# Patient Record
Sex: FEMALE
[De-identification: names, ages, dates, MRNs, and addresses within clinical notes are randomized per-mention and may not be internally consistent; named-entity substitution may affect disease eponyms.]

---

## 2017-11-29 ENCOUNTER — HOSPITAL ENCOUNTER (OUTPATIENT)
Dept: HOSPITAL 5 - OR | Age: 52
Setting detail: OBSERVATION
LOS: 1 days | Discharge: HOME | End: 2017-11-30
Attending: SURGERY | Admitting: SURGERY
Payer: COMMERCIAL

## 2017-11-29 DIAGNOSIS — C50.811: ICD-10-CM

## 2017-11-29 DIAGNOSIS — C50.211: Primary | ICD-10-CM

## 2017-11-29 DIAGNOSIS — Z80.3: ICD-10-CM

## 2017-11-29 DIAGNOSIS — C50.411: ICD-10-CM

## 2017-11-29 PROCEDURE — 88342 IMHCHEM/IMCYTCHM 1ST ANTB: CPT

## 2017-11-29 PROCEDURE — G0378 HOSPITAL OBSERVATION PER HR: HCPCS

## 2017-11-29 PROCEDURE — 38525 BIOPSY/REMOVAL LYMPH NODES: CPT

## 2017-11-29 PROCEDURE — 88307 TISSUE EXAM BY PATHOLOGIST: CPT

## 2017-11-29 PROCEDURE — 88333 PATH CONSLTJ SURG CYTO XM 1: CPT

## 2017-11-29 PROCEDURE — 96374 THER/PROPH/DIAG INJ IV PUSH: CPT

## 2017-11-29 PROCEDURE — 76098 X-RAY EXAM SURGICAL SPECIMEN: CPT

## 2017-11-29 PROCEDURE — 19357 TISS XPNDR PLMT BRST RCNSTJ: CPT

## 2017-11-29 PROCEDURE — 88331 PATH CONSLTJ SURG 1 BLK 1SPC: CPT

## 2017-11-29 PROCEDURE — 78800 RP LOCLZJ TUM 1 AREA 1 D IMG: CPT

## 2017-11-29 PROCEDURE — 64450 NJX AA&/STRD OTHER PN/BRANCH: CPT

## 2017-11-29 PROCEDURE — 96375 TX/PRO/DX INJ NEW DRUG ADDON: CPT

## 2017-11-29 PROCEDURE — 19303 MAST SIMPLE COMPLETE: CPT

## 2017-11-29 PROCEDURE — A9541 TC99M SULFUR COLLOID: HCPCS

## 2017-11-29 PROCEDURE — C1789 PROSTHESIS, BREAST, IMP: HCPCS

## 2017-11-29 RX ADMIN — MIDAZOLAM PRN MG: 1 INJECTION INTRAMUSCULAR; INTRAVENOUS at 14:44

## 2017-11-29 RX ADMIN — MIDAZOLAM PRN MG: 1 INJECTION INTRAMUSCULAR; INTRAVENOUS at 11:14

## 2017-11-29 RX ADMIN — MIDAZOLAM PRN MG: 1 INJECTION INTRAMUSCULAR; INTRAVENOUS at 14:47

## 2017-11-29 RX ADMIN — DOCUSATE SODIUM SCH MG: 100 CAPSULE, LIQUID FILLED ORAL at 22:40

## 2017-11-29 NOTE — ANESTHESIA CONSULTATION
Anesthesia Consult and Med Hx


Date of service: 11/29/17





- Airway


Anesthetic Teeth Evaluation: Good


ROM Head & Neck: Adequate


Mental/Hyoid Distance: Adequate


Mallampati Class: Class II


Intubation Access Assessment: Probably Good





- Pulmonary Exam


CTA: Yes





- Cardiac Exam


Cardiac Exam: RRR





- Pre-Operative Health Status


ASA Pre-Surgery Classification: ASA2


Proposed Anesthetic Plan: General (Never had GA - no family member ever had GA)


Nerve Block: pectoral block





- Central Nervous System


Hx Psychiatric Problems: No





- Other Systems


Hx Alcohol Use: No


Hx Substance Use: No


Hx Cancer: Yes (breast canceer; s/p chemo)

## 2017-11-29 NOTE — SHORT STAY SUMMARY
Short Stay Documentation


Date of service: 11/29/17





- History


H&P: obtained from office





- Allergies and Medications


Current Medications: 


 Allergies





No Known Allergies Allergy (Verified 11/27/17 15:47)


 





 Home Medications











 Medication  Instructions  Recorded  Confirmed  Last Taken  Type


 


Ondansetron [Zofran TAB] 4 mg PO PRN PRN 11/27/17 11/27/17 Unknown History


 


Prochlorperazine Maleate 5 mg PO PRN PRN 11/27/17 11/29/17 11/10/17 History





[Compazine]     








Active Medications





Acetaminophen (Tylenol)  650 mg PO Q6H PRN


   PRN Reason: Pain MILD(1-3)/Fever >100.5/HA


Cefazolin Sodium (Ancef/Sterile Water 2 Gm/20 Ml)  2 gm IV PREOP NR


   Stop: 11/29/17 23:59


Celecoxib (Celebrex)  200 mg PO PREOP NR


   Stop: 11/29/17 23:59


   Last Admin: 11/29/17 11:14 Dose:  200 mg


Dexamethasone (Decadron)  8 mg IV ONCE NR


   Stop: 11/29/17 23:59


Diphenhydramine HCl (Benadryl)  25 mg PO Q8H PRN


   PRN Reason: Itching


Docusate Sodium (Colace)  100 mg PO BID LARRY


Ephedrine Sulfate (Ephedrine Sulfate)  10 mg IV Q5M PRN


   PRN Reason: Blood Pressure


   Last Admin: 11/29/17 16:14 Dose:  10 mg


Famotidine (Pepcid)  20 mg PO PREOP NR


   Stop: 11/29/17 23:59


   Last Admin: 11/29/17 11:14 Dose:  20 mg


Gabapentin (Neurontin)  600 mg PO PREOP LARRY


   Stop: 11/29/17 23:59


   Last Admin: 11/29/17 11:14 Dose:  600 mg


Lactated Ringer's (Lactated Ringers)  1,000 mls @ 75 mls/hr IV AS DIRECT LARRY


   Last Admin: 11/29/17 11:13 Dose:  75 mls/hr


Cefazolin Sodium (Ancef/Ns 1 Gm/50 Ml)  1 gm in 50 mls @ 100 mls/hr IV Q8HR LARRY


   PRN Reason: Protocol


Lactated Ringer's (Lactated Ringers)  1,000 mls @ 125 mls/hr IV AS DIRECT LARRY


Metoclopramide HCl (Reglan)  10 mg PO Q6H PRN


   PRN Reason: Nausea And Vomiting


Midazolam HCl (Versed)  2 mg IV PREOP PRN


   PRN Reason: Anxiety


   Stop: 11/29/17 23:59


   Last Admin: 11/29/17 14:47 Dose:  1 mg


Morphine Sulfate (Morphine)  2 mg IV Q4H PRN


   PRN Reason: Pain, Moderate (4-6)


Ondansetron HCl (Zofran)  4 mg IV Q8H PRN


   PRN Reason: N/V unrelieved by Reglan


Oxycodone/Acetaminophen (Percocet 5/325)  1 tab PO Q6H PRN


   PRN Reason: Pain, Moderate (4-6)


Sodium Chloride (Sodium Chloride Flush Syringe 10 Ml)  10 ml IV PRN PRN


   PRN Reason: LINE FLUSH











- Brief post op/procedure progress note


Date of procedure: 11/29/17


Pre-op diagnosis: Right breast ca


Post-op diagnosis: same


Procedure: 





right total mastectomy with SLNB


Anesthesia: GETA


Findings: 





both bx clips present; 2 SLNs and neg on frozen


Surgeon: SALEEM GEORGE


Assistant: STEPHEN JANSEN


Estimated blood loss: minimal


Pathology: list (right mastectomy; 2 SLNs)


Specimen disposition: to lab


Condition: stable





- Disposition


Condition at discharge: Good


Disposition: DC/[id number] [id number] GEN HOSP IP





Short Stay Discharge Plan


Activity: other (no heavy lifting)


Diet: regular


Wound: other (may shower in 24 hours; no baths, pools or lakes)


Follow up with: 


EZRA CA MD [Primary Care Provider] - 7 Days


SALEEM GEORGE MD [Staff Physician] - 7 Days

## 2017-11-30 VITALS — DIASTOLIC BLOOD PRESSURE: 47 MMHG | SYSTOLIC BLOOD PRESSURE: 82 MMHG

## 2017-11-30 RX ADMIN — OXYCODONE AND ACETAMINOPHEN PRN TAB: 5; 325 TABLET ORAL at 08:35

## 2017-11-30 RX ADMIN — OXYCODONE AND ACETAMINOPHEN PRN TAB: 5; 325 TABLET ORAL at 12:59

## 2017-11-30 RX ADMIN — DOCUSATE SODIUM SCH MG: 100 CAPSULE, LIQUID FILLED ORAL at 12:58

## 2017-11-30 RX ADMIN — OXYCODONE AND ACETAMINOPHEN PRN TAB: 5; 325 TABLET ORAL at 03:13

## 2017-11-30 NOTE — PROGRESS NOTE
Assessment and Plan





This is a 52 year old lady with Stage II  multicentric right breast cancer POD #

1 right total mastectomy with SLNB and tissue expander placement.





1. No acute events overnight, pain in good control.


2. Right chest incision healing well.


3. RACIEL drain education.


4. OOB to hallway.


5. D/C planning for today.





Subjective


Date of service: 11/30/17


Principal diagnosis: Right breast cancer of the uppper outer and upper inner 

quadrant


Interval history: 





Patient underwent a right total mastectomy with SLNB and immediate tissue 

expander placement on 11/29/17





Objective





- Constitutional


Vitals: 


 Vital Signs - 12hr











  11/29/17 11/29/17 11/29/17





  19:52 19:55 20:00


 


Temperature 97 F L  


 


Pulse Rate 94 H 86 79


 


Respiratory 12 12 12





Rate   


 


Blood Pressure 108/64 101/52 101/57


 


Blood Pressure   





[Left]   


 


O2 Sat by Pulse 96 96 100





Oximetry   














  11/29/17 11/29/17 11/29/17





  20:15 20:33 21:02


 


Temperature  97.5 F L 98.1 F


 


Pulse Rate 75 72 101 H


 


Respiratory 14 13 14





Rate   


 


Blood Pressure 92/49 91/48 


 


Blood Pressure   101/61





[Left]   


 


O2 Sat by Pulse 100 100 96





Oximetry   














  11/29/17 11/30/17 11/30/17





  23:18 03:13 04:13


 


Temperature 97.6 F  


 


Pulse Rate 86  


 


Respiratory 16 16 16





Rate   


 


Blood Pressure 87/51  


 


Blood Pressure   





[Left]   


 


O2 Sat by Pulse 99  





Oximetry   














  11/30/17





  04:53


 


Temperature 122.0 F H


 


Pulse Rate 91 H


 


Respiratory 16





Rate 


 


Blood Pressure 82/46


 


Blood Pressure 





[Left] 


 


O2 Sat by Pulse 100





Oximetry 











General appearance: Present: no acute distress





- EENT


Eyes: PERRL, EOM intact


ENT: hearing intact, clear oral mucosa, dentition normal


Ears: bilateral: normal





- Neck


Neck: supple, normal ROM





- Respiratory


Respiratory effort: normal


Respiratory: bilateral: CTA





- Breasts


Breasts: other (right chest incision clean, dry and intact; no hematoma; skin 

well perfused; RACIEL to bulb suction)





- Cardiovascular


Rhythm: regular


Extremities: no ischemia, pulses intact, pulses symmetrical, No edema, normal 

temperature, normal color, Full ROM





- Gastrointestinal


General gastrointestinal: Present: soft, non-tender, non-distended


Rectal Exam: deferred





- Genitourinary


Female genitourinary: deferred





- Integumentary


Integumentary: clear, warm, dry





- Musculoskeletal


Musculoskeletal: strength equal bilaterally





- Neurologic


Neurologic: CNII-XII intact





- Psychiatric


Psychiatric: appropriate mood/affect, intact judgment & insight, memory intact, 

cooperative

## 2017-11-30 NOTE — OPERATIVE REPORT
Operative Report


Operative Report: 








Date of Service: November 29, 2017


Preoperative diagnosis: Multicentric right breast cancer of the upper outer and 

upper inner quadrants





Postoperative diagnosis: Same





Procedure: Right total mastectomy with sentinel lymph node biopsy





Surgeon: Lien Mccarty MD





Asst.: Sarah Jackson MD





Anesthesia: Gen.





Findings: Two right breast clips present within right total mastecomy. 2 

sentinel lymph nodes identified and negative for malignancy on frozen section 

of pathology





Complications: None





Estimated blood loss: Minima





Disposition: Dr. Hoover proceeded with right tissue expander placement.





Indications for operative procedure: This is a 52-year-old lady with stage II 

multicentric right breast cancer. She completed neoadjuvant chemotherapy and 

recommendations were to proceed with right masatectomy with SLN staging. 





Procedure in detail: Anesthesia placed a right pectoral muscle block prior to 

going to the operating room. The patient was taken to the operating room and 

was placed supine. Gen. anesthesia was administered. The right nipple was 

injected with radioisotope adn 1 cc of methylene blue dye. Right breast were 

prepped and draped in the normal sterile postoperative fashion. Timeout was 

performed. Typical mastectomy incision markings were made.





Attention was taken towards the right breast. A gamma probe was inserted into 

the axilla to identify the sentinel lymph node location. A skin incision was 

made with a 10 blade knife and dissection taken down to the subcutaneous 

tissues. First began raising of the superior flap to the level of the clavicle 

superiorly and posteriorly to the pectoralis muscle. Followed by raising of the 

medial flap to the level of the sternum and posteriorly to the pectoralis 

muscle. Followed by raising of the lateral flap to the level of the latissimus 

dorsi muscle and taken down posteriorly. The gamma probe was inserted into the 

axilla and 2 sentinel lymph nodes were identified, all remaining counts were 

less than 10% of the highest SLN count. Lymph nodes were sent to pathology with 

findings negative for malignancy noted on frozen section. Then proceeded with 

raising of the inferior flap to the level of the inframammary fold taken 

posterior to the pectoralis muscle. The mastectomy/breast was removed from the 

pectoralis muscle without incident. The specimen was appropriately marked and 

sent to radiology with findings of 2 breast clips present and sent to 

pathology. Hemostasis was obtained with the bovie cautery. Dr. Hoover then 

proceeded with right tissue expander placment.

## 2017-11-30 NOTE — PROGRESS NOTE
Subjective


Date of service: 11/30/17


Principal diagnosis: Right breast cancer of the uppper outer and upper inner 

quadrant


Interval history: 





1st POD after right mastectomy with breast reconstruction


Patient is in the bed, comfortable. Pain is well controlled with pain meds. 

Ambulated well. No nausea or vomiting. No anesthesia complications. Being 

discharged by the surgeon





Objective





- Constitutional


Vitals: 


 Vital Signs - 12hr











  11/29/17 11/29/17 11/30/17





  21:02 23:18 03:13


 


Temperature 98.1 F 97.6 F 


 


Pulse Rate 101 H 86 


 


Respiratory 14 16 16





Rate   


 


Blood Pressure  87/51 


 


Blood Pressure 101/61  





[Left]   


 


O2 Sat by Pulse 96 99 





Oximetry   














  11/30/17 11/30/17





  04:13 04:53


 


Temperature  97.5 F L


 


Pulse Rate  91 H


 


Respiratory 16 16





Rate  


 


Blood Pressure  82/46


 


Blood Pressure  





[Left]  


 


O2 Sat by Pulse  100





Oximetry

## 2017-12-01 NOTE — XRAY REPORT
SPECIMEN RADIOGRAPH RIGHT BREAST: 11/29/17 09:43:00





CLINICAL: Mastectomy specimen



FINDINGS: 2 biopsy clips are identified within the specimen.

## 2017-12-01 NOTE — OPERATIVE REPORT
PREOPERATIVE DIAGNOSIS:  Personal history of breast cancer.





POSTOPERATIVE DIAGNOSIS:  Personal history of breast cancer.





PROCEDURE:

1.  Immediate breast reconstruction with [place] 475 mL Artoura tissue expander.

2.  Placement of FlexHD acellular dermal matrix.





SURGEON:  Ricki Hoover MD





ASSISTANT:  None.





ANESTHESIA:  General.





DRAINS:  RACIEL x 2.





SPECIMENS:  None.





COMPLICATIONS:  None.





ESTIMATED BLOOD LOSS:  20 mL





INDICATIONS:  The patient has known history of breast cancer who is undergoing a

right mastectomy and has elected to proceed with immediate breast reconstruction

with a tissue expander and acellular dermal matrix.  Other viable options for

reconstruction were discussed fully; however, she wishes to proceed with the

above reconstructive efforts.  She understands the staged nature of breast

reconstruction and additional procedures will be necessary to complete the

reconstructive process.  The nature and technical aspects of the surgery,

typical recovery period, and potential risks involved were discussed fully

including, but not limited to postop bleeding, infection, pronounced scar,

hematoma or seroma formation, poor wound healing or dehiscence, capsular

contracture, implant leak, rupture, failure, need for replacement revision,

future surgery or implant removal, failure to achieve artistic goals,

postoperative pain, need for revision or future surgery.





DESCRIPTION OF PROCEDURE:  The patient was already anesthetized, prepped and

draped following Dr. Mccarty's mastectomy.  The flaps were evaluated and

deemed suitable for continuation of the reconstructive efforts with adequate

thickness and viability.  A subpectoral pocket was developed under direct vision

by dividing the inferior border of the pectoralis major muscle with

electrocautery.  Hemostasis was meticulously achieved with electrocautery and

the pocket was irrigated with copious amounts of triple antibiotic solution

comprised of Ancef, gentamicin, and bacitracin solution.  A triple watched

segment 11 x 20 cm sheet of FlexHD ADM was placed into the breast pocket and

sewn along the inferior edge to the chest wall with interrupted 2-0 PDS.  The

superior border of FlexHD was sewn to the inferior border of the pectoralis

major muscle with interrupted 0 Vicryl suture.  A deflated 475 mL [place] Artoura

tissue expander was placed into the subpectoral sub-ADM pocket.  Lateral closure

was then facilitated with interrupted 0 Vicryl.  The tabs were utilized to sew

the device securely to the chest wall.  A deep #10 RACIEL drain was placed deeply

and then a superficial 7 mm RACIEL drain was placed superficial to the ADM, both

exited through separate inferolateral stab incisions and sewn in place with 2-0

silk.  The patient's closure apparently was mildly stenotic and therefore, I

elected not to place any at this place, any initial saline into the tissue

expander intraoperatively.  Despite this, there was no undue tension after

closure.  Closure was facilitated with running 2-0 Vicryl suture at the

subcutaneous tissue plane, 3-0 Monocryl at the level of deep dermis in an

interrupted manner, a running 4-0 Monocryl at the level of the skin in a

subcuticular fashion and then DermaFlex skin glue.  Fluff dressings were

applied.  The patient was placed in a breast binder.  She was extubated and

transferred to recovery area in stable condition having tolerated the procedure

well.  The skin edges and flaps appeared healthy and viable at termination of

the case.  Note that an additional peripheral section of skin was removed from

the mastectomy flaps to facilitate a straight edge for closure.





DD: 12/01/2017 16:52

DT: 12/01/2017 18:59

JOB# [id number]  [id number]

/CLIFFORD

## 2018-07-20 ENCOUNTER — HOSPITAL ENCOUNTER (OUTPATIENT)
Dept: HOSPITAL 5 - SPVWC | Age: 53
Discharge: HOME | End: 2018-07-20
Attending: SURGERY
Payer: COMMERCIAL

## 2018-07-20 DIAGNOSIS — Z90.11: ICD-10-CM

## 2018-07-20 DIAGNOSIS — Z12.31: Primary | ICD-10-CM

## 2018-07-20 PROCEDURE — 77067 SCR MAMMO BI INCL CAD: CPT

## 2018-08-10 ENCOUNTER — HOSPITAL ENCOUNTER (OUTPATIENT)
Dept: HOSPITAL 5 - SPVWC | Age: 53
Discharge: HOME | End: 2018-08-10
Attending: SURGERY
Payer: COMMERCIAL

## 2018-08-10 DIAGNOSIS — N60.02: Primary | ICD-10-CM

## 2018-08-10 PROCEDURE — 77065 DX MAMMO INCL CAD UNI: CPT

## 2018-08-10 PROCEDURE — 76641 ULTRASOUND BREAST COMPLETE: CPT

## 2018-08-10 PROCEDURE — G0279 TOMOSYNTHESIS, MAMMO: HCPCS

## 2018-08-10 NOTE — MAMMOGRAPHY REPORT
LEFT DIGITAL DIAGNOSTIC MAMMOGRAM with DIGITAL BREAST TOMOSYNTHESIS 

(DBT) and LEFT BREAST ULTRASOUND: 08/10/18 09:31:00



CLINICAL: Recalled for asymmetries identified at screening digital 

breast tomosynthesis (DBT) exam.





COMPARISON:07/20/18



FINDINGS: Implant displaced madi and spot compression CC views were 

performed.  Madi views negative but there is partial effacement of 

asymmetry on the spot CC view. 



Ultrasound of the left breast (including all four quadrants and the 

retroareolar area of each breast) was performed and demonstrated no 

solid mass or shadowing.  A single benign cyst at 9 o'clock 4 cm from 

the nipple measures 5 x 4 x 5 mm.  This is not appear to correlate with 

a mammographic finding.  



IMPRESSION: A single benign cyst and no suspicious finding.





BI-RADS CATEGORY: 2 - - Benign



RECOMMENDATION: Routine mammographic screening in one year.



COMMENT:

Patient follow-up letters are generated by our [employer] application.

## 2018-08-10 NOTE — ULTRASOUND REPORT
LEFT DIGITAL DIAGNOSTIC MAMMOGRAM with DIGITAL BREAST TOMOSYNTHESIS 

(DBT) and LEFT BREAST ULTRASOUND: 08/10/18 09:31:00



CLINICAL: Recalled for asymmetries identified at screening digital 

breast tomosynthesis (DBT) exam.





COMPARISON:07/20/18



FINDINGS: Implant displaced madi and spot compression CC views were 

performed.  Madi views negative but there is partial effacement of 

asymmetry on the spot CC view. 



Ultrasound of the left breast (including all four quadrants and the 

retroareolar area of each breast) was performed and demonstrated no 

solid mass or shadowing.  A single benign cyst at 9 o'clock 4 cm from 

the nipple measures 5 x 4 x 5 mm.  This is not appear to correlate with 

a mammographic finding.  



IMPRESSION: A single benign cyst and no suspicious finding.





BI-RADS CATEGORY: 2 - - Benign



RECOMMENDATION: Routine mammographic screening in one year.



COMMENT:

Patient follow-up letters are generated by our [employer] application.

## 2018-09-18 ENCOUNTER — HOSPITAL ENCOUNTER (OUTPATIENT)
Dept: HOSPITAL 5 - LABHHL | Age: 53
Discharge: HOME | End: 2018-09-18
Attending: SURGERY
Payer: COMMERCIAL

## 2018-09-18 DIAGNOSIS — N60.02: Primary | ICD-10-CM

## 2018-09-18 DIAGNOSIS — Z98.890: ICD-10-CM

## 2018-09-18 DIAGNOSIS — Z85.3: ICD-10-CM

## 2018-09-18 PROCEDURE — 88112 CYTOPATH CELL ENHANCE TECH: CPT

## 2020-02-19 ENCOUNTER — HOSPITAL ENCOUNTER (OUTPATIENT)
Dept: HOSPITAL 5 - SPVWC | Age: 55
Discharge: HOME | End: 2020-02-19
Attending: SURGERY
Payer: COMMERCIAL

## 2020-02-19 DIAGNOSIS — Z98.890: ICD-10-CM

## 2020-02-19 DIAGNOSIS — I89.8: ICD-10-CM

## 2020-02-19 DIAGNOSIS — R92.8: Primary | ICD-10-CM

## 2020-02-19 PROCEDURE — 76942 ECHO GUIDE FOR BIOPSY: CPT

## 2020-02-19 PROCEDURE — 88305 TISSUE EXAM BY PATHOLOGIST: CPT

## 2020-02-19 PROCEDURE — 88342 IMHCHEM/IMCYTCHM 1ST ANTB: CPT

## 2020-02-19 PROCEDURE — 38505 NEEDLE BIOPSY LYMPH NODES: CPT

## 2020-08-11 ENCOUNTER — HOSPITAL ENCOUNTER (OUTPATIENT)
Dept: HOSPITAL 5 - SPVWC | Age: 55
Discharge: HOME | End: 2020-08-11
Attending: SURGERY
Payer: COMMERCIAL

## 2020-08-11 DIAGNOSIS — C50.411: Primary | ICD-10-CM

## 2021-08-02 NOTE — MAMMOGRAPHY REPORT
DIGITAL SCREENING MAMMOGRAM WITH TOMOSYNTHESIS WITH CAD, 8/2/2021



CLINICAL INFORMATION / INDICATION: Routine Screening Mammography. 



TECHNIQUE:  Digital left 2D and 3D mammography with tomosynthesis was obtained in the craniocaudal an
d mediolateral oblique projections.  Computer-Aided Detection (CAD) analysis was used for interpretat
ion of this study.



COMPARISON: 7/30/2020 and 7/29/2019



FINDINGS: 



Breast Density: The breasts are heterogeneously dense, which may obscure small masses.



No dominant mass, suspicious calcifications, or architectural distortion in either breast. 



There is a left breast implant.

 

IMPRESSION: No mammographic evidence of malignancy.



Follow up recommendation: Routine yearly



BI-RADS Category 2:  Benign.





-------------------------------------------------------------------------------------------

A "normal" or negative report should not discourage follow up or biopsy of a clinically significant f
inding.



A written summary of these findings will be mailed to the patient. The patient will be entered into a
 mammography reporting system which will generate a reminder letter for the patient's next appointmen
t at the appropriate interval.



The American College of Radiology recommends yearly mammograms starting at age 40 and continuing as l
adolph as a woman is in good health.  Breast MRI is recommended for women with an approximate 20-25% or 
greater lifetime risk of breast cancer, including women with a strong family history of breast or ova
zuleyka cancer or who have been treated for Hodgkin's disease.



Signer Name: Felipe Bryson MD 

Signed: 8/2/2021 3:35 PM

Workstation Name: [email or web address]

## 2021-08-02 NOTE — MAMMOGRAPHY REPORT
DIGITAL SCREENING MAMMOGRAM WITH TOMOSYNTHESIS WITH CAD, 8/2/2021



CLINICAL INFORMATION / INDICATION: Routine Screening Mammography. 



TECHNIQUE:  Digital left 2D and 3D mammography with tomosynthesis was obtained in the craniocaudal an
d mediolateral oblique projections.  Computer-Aided Detection (CAD) analysis was used for interpretat
ion of this study.



COMPARISON: 7/30/2020 and 7/29/2019



FINDINGS: 



Breast Density: The breasts are heterogeneously dense, which may obscure small masses.



No dominant mass, suspicious calcifications, or architectural distortion in either breast. 



There is a left breast implant.

 

IMPRESSION: No mammographic evidence of malignancy.



Follow up recommendation: Routine yearly



BI-RADS Category 2:  Benign.





-------------------------------------------------------------------------------------------

A "normal" or negative report should not discourage follow up or biopsy of a clinically significant f
inding.



A written summary of these findings will be mailed to the patient. The patient will be entered into a
 mammography reporting system which will generate a reminder letter for the patient's next appointmen
t at the appropriate interval.



The American College of Radiology recommends yearly mammograms starting at age 40 and continuing as l
adolph as a woman is in good health.  Breast MRI is recommended for women with an approximate 20-25% or 
greater lifetime risk of breast cancer, including women with a strong family history of breast or ova
zuleyka cancer or who have been treated for Hodgkin's disease.



Signer Name: Felipe Bryson MD 

Signed: 8/2/2021 3:35 PM

Workstation Name: [email or web address]

## 2021-12-28 NOTE — ULTRASOUND REPORT
ULTRASOUND RIGHT BREAST/AXILLA



INDICATION:

History of right breast cancer and hypermetabolic activity in a right axillary lymph node by recent P
ET.



COMPARISON:

No relevant prior imaging study available.



FINDINGS:

Ultrasound demonstrated 3 right axillary lymph nodes with minimal hilar fat. The largest measures 1.3
 x 0.6 cm with a cortical thickness of 4 mm.



IMPRESSION:

1. 3 suspicious right axillary lymph nodes.. 



Signer Name: Que Reyes MD 

Signed: 2/19/2020 3:14 PM

 Workstation Name: [id number]
ULTRASOUND-GUIDED NEEDLE BIOPSY WITH CLIP PLACEMENT RIGHT AXILLARY LYMPH NODE



INDICATION:

Abnormal lymph node on PET. History of right breast cancer.



COMPARISON:

Report only from recent PET CT.



FINDINGS:

Informed consent was obtained and a timeout was called. The skin was cleansed with chloro prep. Using
 sterile technique, 1% lidocaine local anesthesia, 2% lidocaine with epinephrine for deep anesthesia 
and an 18-gauge coaxial Achieve biopsy device, ultrasound-guided biopsy of the largest right axillary
 lymph node was performed. 3 samples were obtained and placed in formalin. A hydromark clip was deplo
yed within the lymph node.



The patient tolerated the procedure well and there were no apparent complications. She left the depar
tment in good condition and was given instructions for wound care and follow-up.



IMPRESSION:

1. Uncomplicated single guided needle biopsy with clip placement right axillary lymph node.. 



Signer Name: Que Reyes MD 

Signed: 2/19/2020 3:12 PM

 Workstation Name: [id number]
no weakness/no decreased eating/drinking

## 2022-10-25 NOTE — ULTRASOUND REPORT
RIGHT BREAST ULTRASOUND

 

INDICATION: Status post right breast mastectomy with history of benign right axillary lymph node biop
sy. 



COMPARISON: 2/19/2020, 8/10/2018.



FINDINGS: A targeted ultrasound focused in the right axillary region was performed. Two borderline en
larged right axillary lymph nodes are again noted. These measure up to 4 mm in maximal cortical thick
ness, slightly decreased from prior measurement of 6 mm. A prior right axillary lymph node biopsy dem
onstrated benign lymph node tissue. There is no interval detrimental change within these lymph nodes.
 No new suspicious finding is identified.



IMPRESSION:



Slight decrease in size of borderline enlarged right axillary lymph nodes. Patient is status post bio
psy of one of the lymph nodes which was found to be benign. No new suspicious findings. Clinical promise
elation and management is recommended. Patient will be due for annual left breast mammogram in one ye
ar.



BI-RADS Category 2:  Benign. Recommend routine screening mammography in one year





A "normal" or negative report should not discourage follow up or biopsy of a clinically significant f
inding.



A written summary of these findings will be mailed to the patient. 



FURTHER INFORMATION:  According to the American College of Radiology, yearly mammograms are recommend
ed starting at age 40 and continuing as long as a woman is in good health.  Breast MRI is recommended
 for women with an approximately 20-25% or greater lifetime risk of breast cancer, including women wi
th a strong family history of breast or ovarian cancer and women who have been treated for Hodgkin's 
disease.



Signer Name: Delta Miramontes MD 

Signed: 8/11/2020 12:33 PM

Workstation Name: [id number]
Detail Level: Simple
Additional Notes: Patient consent was obtained to proceed with the visit and recommended plan of care after discussion of all risks and benefits, including the risks of COVID-19 exposure.

## 2023-03-13 NOTE — MAMMOGRAPHY REPORT
Screening implant mammogram:



Right breast cancer and mastectomy. Standard imaging over the implant 

is performed bilaterally with displacement images on the left. The 

patient has a left submuscular implant with a generally heterogeneously 

dense surrounding fibroglandular pattern. There is a focal density 

identified just anterior to the left pectoralis muscle in the inferior 

medial breast. This is only identified on the implant images. No 

demonstrable residual fibroglandular tissue identified on the right. No 

prior exam for comparison on the left.



CAD used.



Impression:



1. Left breast density.



2. Right mastectomy with implant.



Recommendation:



Tomomammogram of the left breast and ultrasound if needed. If there is 

a prior exam available for comparison this is recommended prior to 

additional imaging.



BI-RADS CATEGORY:  0 = Needs additional imaging evaluation



ACR BI-RADS MAMMOGRAPHIC CODES:

0 = Needs additional imaging evaluation; 1 = Negative; 2 = Benign; 3 = 

Probably benign; 4 = Suspicious; 5 = Malignant; 6 = Known biopsy-proven 

malignancy



COMMENT:

      1.   Dense breast tissue, i.e., adenosis, fibrocystic    

            changes, etc., may obscure an underlying neoplasm.

      2.   Approximately 10% of cancers are not detected with

            mammography.

      3.   A negative mammography report should not delay biopsy 

            if a clinically suspicious mass is present. Old
Detail Level: Detailed